# Patient Record
(demographics unavailable — no encounter records)

---

## 2021-09-22 NOTE — PHYS DOC
Past History


Past Medical History:  Anxiety


Additional Past Medical Histor:  MOOD D/O


Past Surgical History:  Cholecystectomy


Additional Past Surgical Histo:  left elbow


Smoking:  Cigarettes, Less than 1pk/day


Alcohol Use:  None


Drug Use:  Cocaine, Marijuana, Methamphetamine





Adult General


Chief Complaint


Chief Complaint:  HEAD INJURY/TRAUMA





HPI


HPI


Patient is a 26-year-old female presents with a chief complaint of head 

laceration.  States she bent down to pick something up and her child opened the 

door to go in the house and when she came up she hit the top of her right side 

of her head on the handle.  Denies headache, loss of consciousness, changes in 

vision, neck pain, nausea, vomiting.  States she is not up-to-date on her 

tetanus vaccinations.





Review of Systems


Review of Systems


Review of systems otherwise unremarkable except noted in HPI





Allergies


Allergies





Allergies








Coded Allergies Type Severity Reaction Last Updated Verified


 


  hydrocodone Allergy Unknown  9/22/21 Yes


 


  amoxicillin Adverse Reaction Intermediate  9/22/21 Yes


 


  cefaclor Adverse Reaction Intermediate  9/22/21 Yes











Physical Exam


Physical Exam





Constitutional: Well developed, well nourished, no acute distress, non-toxic 

appearance. []


HENT: Patient has a 1 cm superficial linear laceration on the right parietal 

scalp, bleeding controlled, mild contusion, no signs of skull fracture, 

bilateral external ears normal, oropharynx moist, no oral exudates, nose normal.

 []


Eyes: PERRLA, EOMI, conjunctiva normal, no discharge. [] 


Neck: Normal range of motion, no tenderness, supple, no stridor. [] 


Neurologic: Alert and oriented X 3, normal motor function, normal sensory 

function, able to sit, stand and walk without issue, no focal deficits noted. []


Psychologic: Affect normal, judgement normal, mood normal. []





Current Patient Data


Vital Signs





                                   Vital Signs








  Date Time  Temp Pulse Resp B/P (MAP) Pulse Ox O2 Delivery O2 Flow Rate FiO2


 


9/22/21 19:39 98.8 85 18 102/63 (76) 97 Room Air  











EKG


EKG


[]





Radiology/Procedures


Radiology/Procedures


[]





Heart Score


C/O Chest Pain:  No


Risk Factors:


Risk Factors:  DM, Current or recent (<one month) smoker, HTN, HLP, family 

history of CAD, obesity.


Risk Scores:


Risk Factors:  DM, Current or recent (<one month) smoker, HTN, HLP, family 

history of CAD, obesity.





Course & Med Decision Making


Course & Med Decision Making


Patient is a 26-year-old female who presents with scalp laceration


Vital signs not concerning.  Physical exam noted above.  Given ice pack, Tylenol

 and ibuprofen.  Updated tetanus.


No need for repair.  Given wound care instructions for home.  Advised symptom 

management.  Advised to follow-up in the morning with primary care to set up a 

follow-up.


Patient grateful, verbalized understanding and agreed with plan of discharge.


[]





Dragon Disclaimer


Dragon Disclaimer


This electronic medical record was generated, in whole or in part, using a voice

 recognition dictation system.





Departure


Departure:


Disposition:  01 HOME / SELF CARE / HOMELESS


Condition:  GOOD


Referrals:  


NEREYDA ROB (PCP)


Patient Instructions:  Laceration Care, Adult, Wound Care, Easy-to-Read





Additional Instructions:  


Thank you for coming into the emergency department tonight and allowing us to 

take care of you.  Please read the attached information above to go over things 

we discussed.  Please keep the area clean and dry but do not scrub too hard on 

it as to interrupt scab formation.  You can use Tylenol, ibuprofen and ice as 

needed.  Please follow-up in the morning with your primary care physician to set

 up a follow-up as needed.  Please come back with new or concerning symptoms as 

discussed.











JIM WALTER MD               Sep 22, 2021 20:03

## 2021-09-26 NOTE — PHYS DOC
Past History


Past Medical History:  Anxiety


Additional Past Medical Histor:  MOOD D/O


Past Surgical History:  Cholecystectomy


Additional Past Surgical Histo:  left elbow


Smoking:  Cigarettes, Less than 1pk/day


Alcohol Use:  Occasionally


Drug Use:  Cocaine, Marijuana, Methamphetamine





General Adult


EDM:


Chief Complaint:  NAUSEA/VOMITING/DIARRHEA





HPI:


HPI:


". I ve been vomitng all day.. ".. "  I think I picked up my daughter 24 hour 

bug.. but it been two days for me..:'





Patient is a 26 year old female who presents with nausea and vomiting . Patient 

reports she has vomited 8 times today. Has not been able to tolerate p.o. 

Patient denies pregnancy. Patient has been exposed to her younger daughter that 

had a 24-hour gastroenteritis type presentation. Patient has not completed COVID

vaccination or flu vaccination. Pt. follows with [facility]. No recent travel. No 

specific other ill contacts other than her daughter. No history of 

immunosuppression. Patient does smoke tobacco .. Patient has had previous 

abdomen surgery of cholecystectomy.





Review of Systems:


Review of Systems:


Constitutional: Subjective complaints of fever or chills 


Eyes:  Denies change in visual acuity 


HENT:  Denies nasal congestion or sore throat 


Respiratory:  Denies cough or shortness of breath 


Cardiovascular:  Denies chest pain or edema 


GI: Complains of generalized abdominal pain, nausea, vomiting,. Denies bloody st

ools or diarrhea 


: Denies dysuria 


Musculoskeletal:  Denies back pain or joint pain 


Integument:  Denies rash 


Neurologic:  Denies headache, focal weakness or sensory changes 


Endocrine:  Denies polyuria or polydipsia 


Lymphatic:  Denies swollen glands 


Psychiatric:  Denies depression or anxiety





Family History:


Family History:


Daughter recently had acute gastroenteritis episode for 24 hours is now resolved





Current Medications:


Current Meds:


See nursing for home meds





Allergies:


Allergies:





Allergies








Coded Allergies Type Severity Reaction Last Updated Verified


 


  hydrocodone Allergy Unknown  9/26/21 Yes


 


  amoxicillin Adverse Reaction Intermediate  9/22/21 Yes


 


  cefaclor Adverse Reaction Intermediate  9/22/21 Yes











Physical Exam:


PE:





Constitutional: moderate acute distress, non-toxic appearance. []


HENT: Normocephalic, atraumatic, bilateral external ears normal, oropharynx dry,

 no oral exudates, nose normal. []


Eyes: PERRLA, EOMI, conjunctiva normal, no discharge. [] 


Neck: Normal range of motion, no tenderness, supple, no stridor. [] 


Cardiovascular: Tachycardia heart rate regular rhythm, no murmur []


Lungs & Thorax:  Bilateral breath sounds equal apex with scattered wheezes 

auscultation []


Abdomen: Bowel sounds hyperactive, soft, generalized tenderness, no masses, no 

pulsatile masses. Old surgery scars from cholecystectomy,  Pt.  no focal areas 

of rebound.


Skin: Warm, dry, no erythema, no rash. Poor turgor


Back: No tenderness, no CVA tenderness. [] 


Extremities: No tenderness, no cyanosis, no clubbing, ROM intact, no edema. No 

cording appreciated. No psoas sign.  Lt elbow scar. 


Neurologic: Alert and oriented X 3, normal motor function, normal sensory 

function, no focal deficits noted. []


Psychologic: Affect anxious, judgement normal, mood normal. []





Current Patient Data:


Labs:


Note Labs are not crossing over-  My review of significant labs-normal troponin.

  Normal electrolytes, UTI consistent with her elevated leukocytes esterase and 

bacteria.  Consistent with UTI.  Tox screen positive for marijuana, negative flu

 and negative Covid screen, mild leukocytosis 11.6


Vital Signs:





                                   Vital Signs








  Date Time  Temp Pulse Resp B/P (MAP) Pulse Ox O2 Delivery O2 Flow Rate FiO2


 


9/26/21 19:33 97.7 94 28 121/79 (93) 98 Room Air  











EKG:


EKG:


[]





Radiology/Procedures:


Radiology/Procedures:


[]





Heart Score:


C/O Chest Pain:  N/A


HEART Score for Chest Pain:  








HEART Score for Chest Pain Response (Comments) Value


 


Troponin < Normal Limit 0


 


Total  0








Risk Factors:


Risk Factors:  DM, Current or recent (<one month) smoker, HTN, HLP, family 

history of CAD, obesity.


Risk Scores:


Score 0 - 3:  2.5% MACE over next 6 weeks - Discharge Home


Score 4 - 6:  20.3% MACE over next 6 weeks - Admit for Clinical Observation


Score 7 - 10:  72.7% MACE over next 6 weeks - Early Invasive Strategies





Course & Med Decision Making:


Course & Med Decision Making


Pertinent Labs and Imaging studies reviewed. (See chart for details)





Patient to remain on a clear fluid diet for the next 48 hours.  No solids.  No 

milk products.  Patient may take Zofran 8 mg at 4 times a day for nausea and 

vomiting.  Push fluids.  Avoid illicit drug use.  Encourage patient to stop 

smoking.  Patient encouraged to get flu vaccination Covid vaccination went over 

this acute illness.  Patient to follow-up primary care.  Patient return if any 

concerns.  Patient follow-up pending urine cultures.  Patient to [facility] drinks.  

Patient take Bactrim DS twice a day x7 days.








Impression;





1.  Acute gastroenteritis


2.  Dehydration


3.  Urinary tract infection


4.  History of tobacco and marijuana use.


5 . Mild leukocytosis 11.6





[]





Dragon Disclaimer:


Dragon Disclaimer:


This electronic medical record was generated, in whole or in part, using a voice

 recognition dictation system.





Departure


Departure:


Referrals:  


NEREYDA ROB (PCP)


Scripts


Sulfamethoxazole/Trimethoprim (BACTRIM DS TABLET) 1 Each Tablet


1 TAB PO BID for uti for 10 Days, #20 TAB 0 Refills


   Prov: AMARJIT HAMPTON MD         9/27/21 


Ondansetron Hcl (ZOFRAN) 4 Mg Tablet


8 MG PO QIDPRN PRN for nv, #30 TAB


   Prov: AMARJIT HAMPTON MD         9/27/21





Dragon Disclaimer


This chart was dictated in whole or in part using Voice Recognition software in 

a busy, high-work load, and often noisy Emergency Department environment.  It 

may contain unintended and wholly unrecognized errors or omissions.











AMARJIT HAMPTON MD           Sep 26, 2021 20:35

## 2022-01-08 NOTE — PHYS DOC
Past History


Past Medical History:  Anxiety


Additional Past Medical Histor:  MOOD D/O


Past Surgical History:  Cholecystectomy


Additional Past Surgical Histo:  left elbow


Smoking:  Cigarettes, Less than 1pk/day


Alcohol Use:  Occasionally


Drug Use:  Cocaine, Marijuana, Methamphetamine





Adult General


Chief Complaint


Chief Complaint:  CONGESTION





HPI


HPI





Patient is a 26-year-old female presenting via POV for multiple complaints.  

Reports she has had 48 hours of left-sided lower back pain with dysuria and 

suprapubic pain. She also cites for the last 24 hours she has had nasal 

congestion, fever greater than 100.4 that was responsive to Tylenol, and x1 

episode of nonbloody nonbilious emesis due to nausea prompting her to come in 

for evaluation.  She takes Latuda for bipolar otherwise no diagnosed other 

medical conditions.  She she is not vaccinated against COVID-19





Review of Systems


Review of Systems


Fourteen body systems of review of systems have been reviewed. See HPI for 

pertinent positives and negative responses, other wise all other systems are 

negative, non-pertinent or non-contributory





Allergies


Allergies





Allergies








Coded Allergies Type Severity Reaction Last Updated Verified


 


  Cephalosporins Allergy Unknown  1/8/22 Yes


 


  hydrocodone Allergy Unknown  9/26/21 Yes


 


  amoxicillin Adverse Reaction Intermediate  9/22/21 Yes


 


  cefaclor Adverse Reaction Intermediate  9/22/21 Yes











Physical Exam


Physical Exam


Constitutional: Well developed, well nourished, no acute distress, non-toxic 

appearance. 


HENT: Normocephalic, atraumatic, bilateral external ears normal, oropharynx 

moist, no oral exudates, nose normal. 


Eyes: PERRLA, EOMI, conjunctiva normal, no discharge.  


Neck: Normal range of motion, no tenderness, supple, no stridor.  


Cardiovascular: Heart rate regular, sinus rhythm, no murmurs rubs or gallops


Lungs & Thorax:  Bilateral breath sounds clear to auscultation 


Abdomen: Bowel sounds normal, soft, no tenderness, no masses, no pulsatile 

masses.  Nonsurgical abdomen, no peritoneal signs


Skin: Warm, dry, no erythema, no rash.  


Back: No tenderness, no CVA tenderness.  


Extremities: No tenderness, no cyanosis, no clubbing, ROM intact, no edema.  


Neurologic: Alert and oriented X 3, grossly normal motor & sensory function, no 

focal deficits noted. 


Psychologic: Affect normal, judgement normal, mood normal.





Current Patient Data


Vital Signs





Vital Signs








  Date Time  Temp Pulse Resp B/P (MAP) Pulse Ox O2 Delivery O2 Flow Rate FiO2


 


1/8/22 09:15 97.9 103 18 97/67 (77) 97 Room Air  








Vital Signs








  Date Time  Temp Pulse Resp B/P (MAP) Pulse Ox O2 Delivery O2 Flow Rate FiO2


 


1/8/22 09:15 97.9 103 18 97/67 (77) 97 Room Air  








Lab Results





Laboratory Tests








Test


 1/8/22


09:35 1/8/22


09:42 1/8/22


10:45


 


Urine Collection Type Unknown   


 


Urine Color Yellow   


 


Urine Clarity Hazy   


 


Urine pH 6.0   


 


Urine Specific Gravity 1.025   


 


Urine Protein Neg   


 


Urine Glucose (UA) Neg mg/dL   


 


Urine Ketones (Stick) Neg mg/dL   


 


Urine Blood Small   


 


Urine Nitrite Neg   


 


Urine Bilirubin Neg   


 


Urine Urobilinogen Dipstick 0.2 mg/dL   


 


Urine Leukocyte Esterase Small   


 


Urine RBC 1-2 /HPF   


 


Urine WBC 11-20 /HPF   


 


Urine Squamous Epithelial


Cells Few /LPF 


 


 





 


Urine Bacteria Few /HPF   


 


Urine Mucus Slight /LPF   


 


Bedside Urine HCG, Qualitative  hcg negative  


 


Influenza Type A (Rapid)   Negative 


 


Influenza Type B (Rapid)   Negative 


 


SARS-CoV-2 Antigen (Rapid)   Positive 








Current Medications








 Medications


  (Trade)  Dose


 Ordered  Sig/Kathleen


 Route


 PRN Reason  Start Time


 Stop Time Status Last Admin


Dose Admin


 


 Ketorolac


 Tromethamine


  (Toradol Im)  60 mg  1X  ONCE


 IM


   1/8/22 11:15


 1/8/22 11:16 DC 1/8/22 11:29





 


 Ciprofloxacin


  (Cipro)  500 mg  1X  ONCE


 PO


   1/8/22 11:15


 1/8/22 11:16 DC 1/8/22 11:27














Radiology/Procedures


Radiology/Procedures





Renal ultrasound 1/8/2022





CLINICAL HISTORY: UTI. Flank pain.





TECHNIQUE: A real-time ultrasound examination of both kidneys and the urinary 

bladder was performed. Multiple images were obtained.





FINDINGS: Comparison is made to a CT scan of the abdomen and pelvis dated 

11/7/2018.





Both kidneys are within normal limits in size and echogenicity. The right kidney

measures 9.1 cm in length. The left kidney measures 9.4 cm in length. No focal 

abnormality of either kidney is seen. There is no evidence of hydronephrosis. No

renal calculus is seen.





The urinary bladder is contracted. No abnormality is seen.





IMPRESSION: Negative study.





Electronically signed by: Christiano Domingo MD (1/8/2022 12:38 PM) [id number]





Heart Score


C/O Chest Pain:  No


Risk Factors:


Risk Factors:  DM, Current or recent (<one month) smoker, HTN, HLP, family 

history of CAD, obesity.


Risk Scores:


Risk Factors:  DM, Current or recent (<one month) smoker, HTN, HLP, family 

history of CAD, obesity.





Course & Med Decision Making


Course & Med Decision Making


ABCs nonconcerning


HPI physical exam and comprehensive ER work-up concerning for UTI classified as 

pyelonephritis given reported fever and left CVA pain.  Patient also found to be

COVID-positive in an unvaccinated individual


Given allergies and diagnosis of pyelonephritis, joint decision made to 

administer ciprofloxacin which has been used in the past.  IM Toradol admin

istered with improvement in aches, pains and headache


Patient educated on new diagnosis of COVID-19 infection and need for self 

quarantine with continued supportive care practices, prescribed antibiotics as 

mentioned above and close outpatient follow-up with primary care provider when 

safe to do so in outpatient setting





Dragon Disclaimer


Dragon Disclaimer


This electronic medical record was generated, in whole or in part, using a voice

recognition dictation system.





Departure


Departure:


Impression:  


   Primary Impression:  


   Pyelonephritis


   Additional Impression:  


   COVID-19


Disposition:  01 HOME / SELF CARE / HOMELESS


Condition:  STABLE


Referrals:  


NEREYDA ROB (PCP)


Patient Instructions:  Pyelonephritis, Adult





Additional Instructions:  


As discussed prior to ER departure, you were found to be COVID-positive and 

suffering from pyelonephritis.  You are unvaccinated with COVID, there is not 

much we can do for this besides supportive care which should include Tylenol 

and/or ibuprofen for body aches and pains.  Regarding your pyelonephritis, you 

are prescribed an antibiotic which she should take daily as indicated to 

completion.  You need to call your primary care physician first thing on Monday 

and notify them of your new diagnoses today and need for close outpatient 

follow-up when safe to do so.  You need to self quarantine and continue 

providing supportive care to yourself in the meantime.  If any concerning signs 

or symptoms present prior to outpatient follow-up please do not hesitate to come

back for repeat evaluation.  It was a pleasure to take care of you and I wish 

you a speedy recovery


Scripts


Ciprofloxacin Hcl (CIPROFLOXACIN HCL) 500 Mg Tablet


1 TAB PO BID for pyelonephritis for 7 Days, #13 TAB


   Prov: MINH HUMPHREY DO         1/8/22





Problem Qualifiers











MINH HUMPHREY DO                  Jan 8, 2022 09:44

## 2022-01-08 NOTE — RAD
Renal ultrasound 1/8/2022



CLINICAL HISTORY: UTI. Flank pain.



TECHNIQUE: A real-time ultrasound examination of both kidneys and the urinary bladder was performed. 
Multiple images were obtained.



FINDINGS: Comparison is made to a CT scan of the abdomen and pelvis dated 11/7/2018.



Both kidneys are within normal limits in size and echogenicity. The right kidney measures 9.1 cm in l
ength. The left kidney measures 9.4 cm in length. No focal abnormality of either kidney is seen. Ther
e is no evidence of hydronephrosis. No renal calculus is seen.



The urinary bladder is contracted. No abnormality is seen.



IMPRESSION: Negative study.



Electronically signed by: Christiano Domingo MD (1/8/2022 12:38 PM) [id number]

## 2022-02-02 NOTE — RAD
EXAM:  XR LUMBAR SPINE 4+V 2/2/2022 11:25 AM



CLINICAL INDICATION:  Pain, recent Covid, kidney infection



COMPARISON:  CT abdomen pelvis 11/7/2018



TECHNIQUE:  5 views of the lumbar spine



FINDINGS:  No acute fracture. Alignment is normal. Disc spaces are maintained. Facet joints are theodore
l. There are cholecystectomy clips.



IMPRESSION:  Normal lumbar spine.



Electronically signed by: Tashia Singh MD (2/2/2022 11:41 AM) [id number]

## 2022-05-06 NOTE — RAD
EXAM:  XR EXAM OF ANKLE_LEFT 3V 5/6/2022 1:04 PM



CLINICAL INDICATION:  Pain after inversion injury



COMPARISON:  None



TECHNIQUE:  AP, oblique, and lateral views of the left ankle



FINDINGS:  No acute fracture. Alignment is normal. Ankle mortise is symmetric and talar dome is intac
t. Tiny posterior calcaneal enthesophyte. Mild lateral soft tissue swelling.



IMPRESSION:  No acute osseous abnormality.



Electronically signed by: Tashia Singh MD (5/6/2022 1:06 PM) [id number]

## 2022-05-06 NOTE — PHYS DOC
Past History


Past Medical History:  Anxiety


Additional Past Medical Histor:  MOOD D/O


Past Surgical History:  Cholecystectomy, Other


Additional Past Surgical Histo:  left elbow


Smoking:  Cigarettes, Less than 1pk/day


Alcohol Use:  Occasionally


Drug Use:  Cocaine, Marijuana, Methamphetamine





Adult General


Chief Complaint


Chief Complaint:  ANKLE PROBLEM





HPI


HPI





Patient is a 26-year-old female presenting to the emergency department for 

evaluation of left ankle pain status post stepping on a stair awkwardly and 

inverting her ankle.  She denies any other areas of injury or pain and no 

weakness numbness or tingling.





Review of Systems


Review of Systems





Constitutional: Denies fever or chills []


Musculoskeletal: Denies back pain.  Positive left ankle joint pain


Integument: Denies rash or skin lesions []


Neurologic: Denies headache, focal weakness or sensory changes []





All other systems were reviewed and found to be within normal limits, except as 

documented in this note.





Current Medications


Current Medications





Current Medications








 Medications


  (Trade)  Dose


 Ordered  Sig/Kathleen  Start Time


 Stop Time Status Last Admin


Dose Admin


 


 Ibuprofen


  (Motrin)  600 mg  1X  ONCE  5/6/22 12:45


 5/6/22 12:55 DC  














Allergies


Allergies





Allergies








Coded Allergies Type Severity Reaction Last Updated Verified


 


  Cephalosporins Allergy Unknown  1/8/22 Yes


 


  hydrocodone Allergy Unknown  9/26/21 Yes


 


  amoxicillin Adverse Reaction Intermediate  9/22/21 Yes


 


  cefaclor Adverse Reaction Intermediate  9/22/21 Yes











Physical Exam


Physical Exam





Constitutional: Well developed, well nourished, no acute distress, non-toxic 

appearance. []


Extremities: No proximal tib-fib tenderness to palpation and no bony foot 

tenderness to palpation however patient has pain and swelling noted to left 

lateral malleolus.


Neurologic: Alert and oriented X 3, normal motor function, normal sensory 

function, no focal deficits noted. []





Current Patient Data


Vital Signs





                                   Vital Signs








  Date Time  Temp Pulse Resp B/P (MAP) Pulse Ox O2 Delivery O2 Flow Rate FiO2


 


5/6/22 12:40 98.0  16 106/76 (86)  Room Air  











EKG


EKG


[]





Radiology/Procedures


Radiology/Procedures


[]





Heart Score


C/O Chest Pain:  No


Risk Factors:


Risk Factors:  DM, Current or recent (<one month) smoker, HTN, HLP, family 

history of CAD, obesity.


Risk Scores:


Risk Factors:  DM, Current or recent (<one month) smoker, HTN, HLP, family 

history of CAD, obesity.





Course & Med Decision Making


Course & Med Decision Making


Patient's x-ray is negative and she is neurovascularly intact.  She will be 

discharged with a diagnosis of a left ankle sprain told to stay off of it until 

she is pain-free and follow with PCP within 1 week for recheck and come back to 

emergency department sooner with worsening pain neurologic changes with general 

concerns.  Patient aware and agreeable with plan and verbalized understanding of

the above instructions.





Dragon Disclaimer


Dragon Disclaimer


This electronic medical record was generated, in whole or in part, using a voice

recognition dictation system.





Departure


Departure:


Impression:  


   Primary Impression:  


   Left ankle sprain


Disposition:  01 HOME / SELF CARE / HOMELESS


Condition:  STABLE


Referrals:  


NEREYDA ROB (PCP)


Patient Instructions:  Ankle Sprain, Easy-to-Read





Additional Instructions:  


Take ibuprofen and tylenol for pain.


Rest


Ice


Compression


Elevation


PCP f/u in 1 week.





Problem Qualifiers








   Primary Impression:  


   Left ankle sprain


   Encounter type:  initial encounter  Involved ligament of ankle:  unspecified 

   ligament  Qualified Codes:  S93.402A - Sprain of unspecified ligament of left

   ankle, initial encounter








MARGE BAILEY DO              May 6, 2022 13:13